# Patient Record
Sex: MALE | Race: WHITE | Employment: FULL TIME | URBAN - METROPOLITAN AREA
[De-identification: names, ages, dates, MRNs, and addresses within clinical notes are randomized per-mention and may not be internally consistent; named-entity substitution may affect disease eponyms.]

---

## 2020-01-06 ENCOUNTER — TELEPHONE (OUTPATIENT)
Dept: INTERNAL MEDICINE CLINIC | Age: 40
End: 2020-01-06

## 2020-02-20 NOTE — PROGRESS NOTES
[address] [facility]   Cardiovascular Evaluation    PATIENT: Chao Hassan  DATE: 2020  MRN: <[id number]>  CSN: [id number]  : 1980      Primary Care Doctor: No primary care provider on file. Reason for evaluation:   New Patient; Chest Pain; Palpitations; and Fatigue      Subjective:   History of present illness on initial date of evaluation:   Gregoria Cordova is a 46 y.o. patient who presents for cardiac evaluation for chest pain. Today he reports he has been feeling chest pain, palpitations and SOB. Symptoms ongoing x 1 month. He states the pain is heavy. He states going up and down stairs make symptoms worse. He states the palpitations feels like \"popcorn on a hot plate\". He states he has no medical issues. No recent colds or viruses. He denies any edema, dizziness or syncope. He does not smoke. Patient Active Problem List   Diagnosis    Other chest pain    Palpitations    SOB (shortness of breath)         Past Medical History:   has no past medical history on file. Surgical History:   has no past surgical history on file. Social History:   reports that he has never smoked. He has never used smokeless tobacco. He reports previous alcohol use. Family History:  Father MI - age 39    Home Medications:  Reviewed and are listed in nursing record. and/or listed below  Current Outpatient Medications   Medication Sig Dispense Refill    Omega-3 Fatty Acids (FISH OIL PO) Take 1 tablet by mouth daily      Multiple Vitamins-Minerals (THERAPEUTIC MULTIVITAMIN-MINERALS) tablet Take 1 tablet by mouth daily       No current facility-administered medications for this visit. Allergies:  Latex and Ciprofloxacin     Review of Systems:   A 14 point review of symptoms completed. Pertinent positives identified in the HPI, all other review of symptoms negative as below. Objective:   PHYSICAL EXAM:    There were no vitals filed for this visit.        Wt Readings from Last 3 Encounters:   No data found for Wt         General Appearance:  Alert, cooperative, no distress, appears stated age   Head:  Normocephalic, atraumatic   Eyes:  PERRL, conjunctiva/corneas clear   Nose: Nares normal, no drainage or sinus tenderness   Throat: Lips, mucosa, and tongue normal   Neck: Supple, symmetrical, trachea midline, NL thyroid no carotid bruit or JVD   Lungs:   CTAB, respirations unlabored   Chest Wall:  No tenderness or deformity   Heart:  Regular rhythm and normal rate; S1, S2 are normal;   no murmur noted; no rub or gallop   Abdomen:   Soft, non-tender, +BS x 4, no masses, no organomegaly   Extremities: Extremities normal, atraumatic, no cyanosis or edema   Pulses: 2+ and symmetric   Skin: Skin color, texture, turgor normal, no rashes or lesions   Pysch: Normal mood and affect   Neurologic: Normal gross motor and sensory exam.         LABS   CBC:    No results found for: WBC, RBC, HGB, HCT, MCV, RDW, PLT  CMP:  No results found for: NA, K, CL, CO2, BUN, CREATININE, GFRAA, AGRATIO, LABGLOM, GLUCOSE, PROT, CALCIUM, BILITOT, ALKPHOS, AST, ALT  PT/INR:   No results found for: PTINR  Liver:  No components found for: CHLPL  No results found for: ALT, AST, GGT, ALKPHOS, BILITOT  No results found for: LABA1C  Lipids:       No results found for: TRIG       No results found for: HDL       No results found for: LDLCALC       No results found for: LABVLDL      CARDIAC DATA   EK2020 NSR no ischemia    ECHO/MUGA:    STRESS TEST:    CARDIAC CATH:    VASCULAR/OTHER IMAGING:      Assessment and Plan   Maria Ines Bowman is a 46 y.o. male who presents today for the following problems:      1. Chest pain  2. Palpitations: sounds like possible PVC/PAC  3. Strong FMhx of early CAD      MD Plan:  1. Echo  2. GXT- ECG  3. Lipids, CBC, CMP, sed, CRP  4.  May consider Cardiac CTA if p interested given FMHx       Patient Active Problem List   Diagnosis    Other chest pain    Palpitations    SOB (shortness of breath)       Patient Plan:  1. Labs - sed rate, crp, lipids, cmp, cbc  2. Echocardiogram to view size and strength of the heart   3. Stress test to risk stratify   4. 2 week cardiac monitor   5. We will call you with the results         It is a pleasure to assist in the care of Star.me. Please call with any questions. This note was scribed in the presence of Robert Begum MD by Chelsea Noland RN.      Tamar June MD, personally performed the services described in this documentation as scribed by the above signed scribe in my presence, and it is both accurate and complete to the best of our ability and knowledge. I agree with the details independently gathered by my clinical support staff, while the remaining scribed note accurately describes my personal service to the patient. The above RN is working as a scribe for and in the presence of myself . Working as a scribe, the RN may have prepopulated components of this note with my historical intellectual property under my direct supervision. Any additions to this intellectual property were performed at my direction. Furthermore, the content and accuracy of this note have been reviewed by me to the best of my ability.            Lucy Wright MD, [address] [facility] Cardiologist  [facility]  [phone] [facility]  [phone] [address] [place] [address]

## 2020-02-21 ENCOUNTER — OFFICE VISIT (OUTPATIENT)
Dept: CARDIOLOGY CLINIC | Age: 40
End: 2020-02-21
Payer: COMMERCIAL

## 2020-02-21 PROBLEM — R00.2 PALPITATIONS: Status: ACTIVE | Noted: 2020-02-21

## 2020-02-21 PROBLEM — R07.89 OTHER CHEST PAIN: Status: ACTIVE | Noted: 2020-02-21

## 2020-02-21 PROBLEM — R06.02 SOB (SHORTNESS OF BREATH): Status: ACTIVE | Noted: 2020-02-21

## 2020-02-21 PROCEDURE — 99243 OFF/OP CNSLTJ NEW/EST LOW 30: CPT | Performed by: INTERNAL MEDICINE

## 2020-02-21 PROCEDURE — 93000 ELECTROCARDIOGRAM COMPLETE: CPT | Performed by: INTERNAL MEDICINE

## 2020-02-21 RX ORDER — M-VIT,TX,IRON,MINS/CALC/FOLIC 27MG-0.4MG
1 TABLET ORAL DAILY
COMMUNITY

## 2020-02-21 NOTE — PATIENT INSTRUCTIONS
Patient Plan:  1. Labs - sed rate, crp, lipids, cmp, cbc  2. Echocardiogram to view size and strength of the heart   3. Stress test to risk stratify   4. 2 week cardiac monitor   5.  We will call you with the results

## 2020-03-02 ENCOUNTER — HOSPITAL ENCOUNTER (OUTPATIENT)
Dept: NON INVASIVE DIAGNOSTICS | Age: 40
Discharge: HOME | End: 2020-03-02
Payer: COMMERCIAL

## 2020-03-02 LAB
LV EF: 58 %
LVEF MODALITY: NORMAL

## 2020-03-02 PROCEDURE — 93306 TTE W/DOPPLER COMPLETE: CPT

## 2020-03-02 PROCEDURE — 0296T PR EXT ECG > 48HR TO 21 DAY RCRD W/CONECT INTL RCRD: CPT | Performed by: INTERNAL MEDICINE

## 2020-03-02 PROCEDURE — 93017 CV STRESS TEST TRACING ONLY: CPT

## 2020-03-03 ENCOUNTER — TELEPHONE (OUTPATIENT)
Dept: CARDIOLOGY CLINIC | Age: 40
End: 2020-03-03

## 2020-03-06 DIAGNOSIS — E78.5 DYSLIPIDEMIA: ICD-10-CM

## 2020-03-06 DIAGNOSIS — R07.89 OTHER CHEST PAIN: ICD-10-CM

## 2020-03-06 DIAGNOSIS — E78.2 MIXED HYPERLIPIDEMIA: ICD-10-CM

## 2020-03-06 DIAGNOSIS — R06.02 SOB (SHORTNESS OF BREATH): ICD-10-CM

## 2020-03-06 DIAGNOSIS — R73.9 HYPERGLYCEMIA: ICD-10-CM

## 2020-03-06 LAB
BASOPHILS ABSOLUTE: 0.1 K/UL (ref 0–0.2)
BASOPHILS RELATIVE PERCENT: 0.9 %
C-REACTIVE PROTEIN: 3.1 MG/L (ref 0–5.1)
CHOLESTEROL, FASTING: 212 MG/DL (ref 0–199)
EOSINOPHILS ABSOLUTE: 0.1 K/UL (ref 0–0.6)
EOSINOPHILS RELATIVE PERCENT: 2.6 %
ESTIMATED AVERAGE GLUCOSE: 91.1 MG/DL
HBA1C MFR BLD: 4.8 %
HCT VFR BLD CALC: 44.9 % (ref 40.5–52.5)
HDLC SERPL-MCNC: 46 MG/DL (ref 40–60)
HEMOGLOBIN: 15 G/DL (ref 13.5–17.5)
LDL CHOLESTEROL CALCULATED: 149 MG/DL
LYMPHOCYTES ABSOLUTE: 1.6 K/UL (ref 1–5.1)
LYMPHOCYTES RELATIVE PERCENT: 29.4 %
MCH RBC QN AUTO: 29.5 PG (ref 26–34)
MCHC RBC AUTO-ENTMCNC: 33.5 G/DL (ref 31–36)
MCV RBC AUTO: 88 FL (ref 80–100)
MONOCYTES ABSOLUTE: 0.4 K/UL (ref 0–1.3)
MONOCYTES RELATIVE PERCENT: 7.6 %
NEUTROPHILS ABSOLUTE: 3.3 K/UL (ref 1.7–7.7)
NEUTROPHILS RELATIVE PERCENT: 59.5 %
PDW BLD-RTO: 12.9 % (ref 12.4–15.4)
PLATELET # BLD: 242 K/UL (ref 135–450)
PMV BLD AUTO: 8.9 FL (ref 5–10.5)
RBC # BLD: 5.1 M/UL (ref 4.2–5.9)
SEDIMENTATION RATE, ERYTHROCYTE: 8 MM/HR (ref 0–15)
TRIGLYCERIDE, FASTING: 86 MG/DL (ref 0–150)
VLDLC SERPL CALC-MCNC: 17 MG/DL
WBC # BLD: 5.5 K/UL (ref 4–11)

## 2020-03-23 PROCEDURE — 0298T PR EXT ECG > 48HR TO 21 DAY REVIEW AND INTERPRETATN: CPT | Performed by: INTERNAL MEDICINE

## 2020-03-25 ENCOUNTER — TELEPHONE (OUTPATIENT)
Dept: CARDIOLOGY CLINIC | Age: 40
End: 2020-03-25

## 2020-08-19 ENCOUNTER — OFFICE VISIT (OUTPATIENT)
Dept: INTERNAL MEDICINE CLINIC | Age: 40
End: 2020-08-19
Payer: COMMERCIAL

## 2020-08-19 VITALS
HEART RATE: 83 BPM | DIASTOLIC BLOOD PRESSURE: 68 MMHG | WEIGHT: 121.4 LBS | BODY MASS INDEX: 21.51 KG/M2 | SYSTOLIC BLOOD PRESSURE: 112 MMHG | OXYGEN SATURATION: 99 % | HEIGHT: 63 IN | TEMPERATURE: 97.7 F

## 2020-08-19 PROBLEM — R06.02 SOB (SHORTNESS OF BREATH): Status: RESOLVED | Noted: 2020-02-21 | Resolved: 2020-08-19

## 2020-08-19 PROBLEM — Z82.49 FHX: PREMATURE CORONARY HEART DISEASE: Status: ACTIVE | Noted: 2020-08-19

## 2020-08-19 PROBLEM — R07.89 OTHER CHEST PAIN: Status: RESOLVED | Noted: 2020-02-21 | Resolved: 2020-08-19

## 2020-08-19 PROBLEM — R00.2 PALPITATIONS: Status: RESOLVED | Noted: 2020-02-21 | Resolved: 2020-08-19

## 2020-08-19 LAB
A/G RATIO: 1.8 (ref 1.1–2.2)
ALBUMIN SERPL-MCNC: 4.6 G/DL (ref 3.4–5)
ALP BLD-CCNC: 58 U/L (ref 40–129)
ALT SERPL-CCNC: 18 U/L (ref 10–40)
ANION GAP SERPL CALCULATED.3IONS-SCNC: 13 MMOL/L (ref 3–16)
AST SERPL-CCNC: 21 U/L (ref 15–37)
BILIRUB SERPL-MCNC: 0.3 MG/DL (ref 0–1)
BUN BLDV-MCNC: 11 MG/DL (ref 7–20)
CALCIUM SERPL-MCNC: 9.8 MG/DL (ref 8.3–10.6)
CHLORIDE BLD-SCNC: 102 MMOL/L (ref 99–110)
CHOLESTEROL, TOTAL: 196 MG/DL (ref 0–199)
CO2: 25 MMOL/L (ref 21–32)
CREAT SERPL-MCNC: 0.8 MG/DL (ref 0.9–1.3)
GFR AFRICAN AMERICAN: >60
GFR NON-AFRICAN AMERICAN: >60
GLOBULIN: 2.6 G/DL
GLUCOSE BLD-MCNC: 100 MG/DL (ref 70–99)
HDLC SERPL-MCNC: 45 MG/DL (ref 40–60)
LDL CHOLESTEROL CALCULATED: 136 MG/DL
POTASSIUM SERPL-SCNC: 4.3 MMOL/L (ref 3.5–5.1)
SODIUM BLD-SCNC: 140 MMOL/L (ref 136–145)
TOTAL PROTEIN: 7.2 G/DL (ref 6.4–8.2)
TRIGL SERPL-MCNC: 75 MG/DL (ref 0–150)
TSH REFLEX: 1.33 UIU/ML (ref 0.27–4.2)
VLDLC SERPL CALC-MCNC: 15 MG/DL

## 2020-08-19 PROCEDURE — 99385 PREV VISIT NEW AGE 18-39: CPT | Performed by: INTERNAL MEDICINE

## 2020-08-19 PROCEDURE — 36415 COLL VENOUS BLD VENIPUNCTURE: CPT | Performed by: INTERNAL MEDICINE

## 2020-08-19 ASSESSMENT — PATIENT HEALTH QUESTIONNAIRE - PHQ9
1. LITTLE INTEREST OR PLEASURE IN DOING THINGS: 0
SUM OF ALL RESPONSES TO PHQ QUESTIONS 1-9: 0
SUM OF ALL RESPONSES TO PHQ QUESTIONS 1-9: 0
SUM OF ALL RESPONSES TO PHQ9 QUESTIONS 1 & 2: 0
2. FEELING DOWN, DEPRESSED OR HOPELESS: 0

## 2020-08-19 NOTE — PROGRESS NOTES
[facility] Primary [facility]  Internal Medicine Progress Note  Yanelis Zarate MD  8/19/2020    Sydni Flaherty  YOB: 1980    Medical Assistant Triage:     Chief Complaint   Patient presents with    New Patient     EST CARE       HPI: 46 y.o. male here today to establish care and assess status of chronic medical problems. Referred by Dr. Dania Platt. 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 Trained in [place]. From [facility]) originally. Recent cardiac eval for palpitations atypical chest pain that was normal.  Saw Dr. Aishwarya Gonzalez. Was concerned because he has a family history of premature coronary artery disease. Since having his evaluation he is no longer having symptoms. Only current complaint is that his ears pop periodically and sometimes feel itchy. He has not previously had environmental allergies. 4-5 cups coffee or tea during day.      Past Medical History:   Diagnosis Date    FHx: premature coronary heart disease 8/19/2020    Dad at 36       Past Surgical History:   Procedure Laterality Date    ORCHIOPEXY Right 1986       Family Hx:      Problem Relation Age of Onset    High Cholesterol Mother     High Cholesterol Father     Heart Attack Father 36        nonsmoker    Coronary Art Dis Father     Coronary Art Dis Maternal Grandmother     Heart Attack Maternal Grandmother     High Cholesterol Maternal Grandmother     Diabetes Maternal Grandmother     Coronary Art Dis Maternal Grandfather     Diabetes Maternal Grandfather     Heart Attack Maternal Grandfather     High Cholesterol Maternal Grandfather     Coronary Art Dis Paternal Grandmother     Diabetes Paternal Grandmother     Heart Attack Paternal Grandmother     High Cholesterol Paternal Grandmother     Coronary Art Dis Paternal Grandfather     Diabetes Paternal Grandfather     Heart Attack Paternal Grandfather     High Cholesterol Paternal Grandfather     Coronary Art Dis Paternal Findings: No rash. Comments: No suspicious lesions   Neurological:      General: No focal deficit present. Mental Status: He is alert and oriented to person, place, and time. Psychiatric:         Mood and Affect: Mood normal.         Behavior: Behavior normal.         ASSESSMENT/PLAN:  Annual physical exam  Discussed age appropriate preventive care including healthy diet, daily exercise, immunizations and age & gender guided screening tests. Lipid screening  -     Lipid Panel    Fatigue, unspecified type  Mild, nonspecific. Likely related to stress of fellowship and looming boards for peds anesthesia and pain  -     TSH with Reflex  -     Comprehensive Metabolic Panel    Screening for diabetes mellitus  -     Comprehensive Metabolic Panel    FHx: premature coronary heart disease    Return in about 1 year (around 8/19/2021) for CPE. Marcelle Perea MD    This note was generated completely or in part utilizing Dragon dictation speech recognition software. Occasionally, words are mistranscribed and despite editing, the text may contain inaccuracies due to incorrect word recognition.   If further clarification is needed please contact the office at [phone]